# Patient Record
Sex: MALE | Race: WHITE | NOT HISPANIC OR LATINO | Employment: FULL TIME | URBAN - METROPOLITAN AREA
[De-identification: names, ages, dates, MRNs, and addresses within clinical notes are randomized per-mention and may not be internally consistent; named-entity substitution may affect disease eponyms.]

---

## 2023-03-04 LAB
ALBUMIN (G/DL) IN SER/PLAS: 4.1 G/DL (ref 3.4–5)
ALBUMIN (MG/L) IN URINE: 29 MG/L
ALBUMIN/CREATININE (UG/MG) IN URINE: 58.9 UG/MG CRT (ref 0–30)
ANION GAP IN SER/PLAS: 14 MMOL/L (ref 10–20)
CALCIDIOL (25 OH VITAMIN D3) (NG/ML) IN SER/PLAS: 37 NG/ML
CALCIUM (MG/DL) IN SER/PLAS: 9.9 MG/DL (ref 8.6–10.6)
CARBON DIOXIDE, TOTAL (MMOL/L) IN SER/PLAS: 22 MMOL/L (ref 21–32)
CHLORIDE (MMOL/L) IN SER/PLAS: 105 MMOL/L (ref 98–107)
CREATININE (MG/DL) IN SER/PLAS: 1.07 MG/DL (ref 0.5–1.3)
CREATININE (MG/DL) IN URINE: 49.2 MG/DL (ref 20–370)
ERYTHROCYTE DISTRIBUTION WIDTH (RATIO) BY AUTOMATED COUNT: 12.2 % (ref 11.5–14.5)
ERYTHROCYTE MEAN CORPUSCULAR HEMOGLOBIN CONCENTRATION (G/DL) BY AUTOMATED: 32.2 G/DL (ref 32–36)
ERYTHROCYTE MEAN CORPUSCULAR VOLUME (FL) BY AUTOMATED COUNT: 86 FL (ref 80–100)
ERYTHROCYTES (10*6/UL) IN BLOOD BY AUTOMATED COUNT: 5.22 X10E12/L (ref 4.5–5.9)
ESTIMATED AVERAGE GLUCOSE FOR HBA1C: 143 MG/DL
GFR MALE: 77 ML/MIN/1.73M2
GLUCOSE (MG/DL) IN SER/PLAS: 137 MG/DL (ref 74–99)
HEMATOCRIT (%) IN BLOOD BY AUTOMATED COUNT: 45 % (ref 41–52)
HEMOGLOBIN (G/DL) IN BLOOD: 14.5 G/DL (ref 13.5–17.5)
HEMOGLOBIN A1C/HEMOGLOBIN TOTAL IN BLOOD: 6.6 %
LEUKOCYTES (10*3/UL) IN BLOOD BY AUTOMATED COUNT: 6.5 X10E9/L (ref 4.4–11.3)
NRBC (PER 100 WBCS) BY AUTOMATED COUNT: 0 /100 WBC (ref 0–0)
PHOSPHATE (MG/DL) IN SER/PLAS: 2.9 MG/DL (ref 2.5–4.9)
PLATELETS (10*3/UL) IN BLOOD AUTOMATED COUNT: 213 X10E9/L (ref 150–450)
POTASSIUM (MMOL/L) IN SER/PLAS: 4.3 MMOL/L (ref 3.5–5.3)
SODIUM (MMOL/L) IN SER/PLAS: 137 MMOL/L (ref 136–145)
TACROLIMUS (NG/ML) IN BLOOD: 10.7 NG/ML (ref 2–15)
UREA NITROGEN (MG/DL) IN SER/PLAS: 29 MG/DL (ref 6–23)

## 2023-07-28 ENCOUNTER — APPOINTMENT (OUTPATIENT)
Dept: LAB | Facility: LAB | Age: 63
End: 2023-07-28
Payer: MEDICARE

## 2023-07-28 LAB
ALBUMIN (G/DL) IN SER/PLAS: 5.1 G/DL (ref 3.4–5)
ANION GAP IN SER/PLAS: 14 MMOL/L (ref 10–20)
CALCIUM (MG/DL) IN SER/PLAS: 11.5 MG/DL (ref 8.6–10.6)
CARBON DIOXIDE, TOTAL (MMOL/L) IN SER/PLAS: 25 MMOL/L (ref 21–32)
CHLORIDE (MMOL/L) IN SER/PLAS: 105 MMOL/L (ref 98–107)
CHOLESTEROL (MG/DL) IN SER/PLAS: 145 MG/DL (ref 0–199)
CHOLESTEROL IN HDL (MG/DL) IN SER/PLAS: 59.3 MG/DL
CHOLESTEROL/HDL RATIO: 2.4
CREATININE (MG/DL) IN SER/PLAS: 1.11 MG/DL (ref 0.5–1.3)
ERYTHROCYTE DISTRIBUTION WIDTH (RATIO) BY AUTOMATED COUNT: 12.7 % (ref 11.5–14.5)
ERYTHROCYTE MEAN CORPUSCULAR HEMOGLOBIN CONCENTRATION (G/DL) BY AUTOMATED: 31.5 G/DL (ref 32–36)
ERYTHROCYTE MEAN CORPUSCULAR VOLUME (FL) BY AUTOMATED COUNT: 88 FL (ref 80–100)
ERYTHROCYTES (10*6/UL) IN BLOOD BY AUTOMATED COUNT: 5.45 X10E12/L (ref 4.5–5.9)
GFR MALE: 74 ML/MIN/1.73M2
GLUCOSE (MG/DL) IN SER/PLAS: 125 MG/DL (ref 74–99)
HEMATOCRIT (%) IN BLOOD BY AUTOMATED COUNT: 48 % (ref 41–52)
HEMOGLOBIN (G/DL) IN BLOOD: 15.1 G/DL (ref 13.5–17.5)
LDL: 66 MG/DL (ref 0–99)
LEUKOCYTES (10*3/UL) IN BLOOD BY AUTOMATED COUNT: 5.3 X10E9/L (ref 4.4–11.3)
NRBC (PER 100 WBCS) BY AUTOMATED COUNT: 0 /100 WBC (ref 0–0)
PHOSPHATE (MG/DL) IN SER/PLAS: 2.4 MG/DL (ref 2.5–4.9)
PLATELETS (10*3/UL) IN BLOOD AUTOMATED COUNT: 203 X10E9/L (ref 150–450)
POTASSIUM (MMOL/L) IN SER/PLAS: 4.7 MMOL/L (ref 3.5–5.3)
SODIUM (MMOL/L) IN SER/PLAS: 139 MMOL/L (ref 136–145)
TACROLIMUS (NG/ML) IN BLOOD: 6.2 NG/ML (ref 2–15)
TRIGLYCERIDE (MG/DL) IN SER/PLAS: 99 MG/DL (ref 0–149)
UREA NITROGEN (MG/DL) IN SER/PLAS: 24 MG/DL (ref 6–23)
VLDL: 20 MG/DL (ref 0–40)

## 2023-07-31 LAB — FRUCTOSAMINE SER/PLAS: 269 UMOL/L (ref 205–285)

## 2023-08-18 RX ORDER — MYCOPHENOLATE MOFETIL 250 MG/1
750 CAPSULE ORAL 2 TIMES DAILY
COMMUNITY
Start: 2017-08-04

## 2023-08-18 RX ORDER — SIMVASTATIN 20 MG/1
20 TABLET, FILM COATED ORAL DAILY
COMMUNITY
Start: 2016-11-12

## 2023-08-18 RX ORDER — TACROLIMUS 1 MG/1
2 CAPSULE ORAL 2 TIMES DAILY
COMMUNITY
Start: 2017-08-04 | End: 2024-03-04 | Stop reason: SDUPTHER

## 2023-08-18 RX ORDER — LISINOPRIL 10 MG/1
10 TABLET ORAL DAILY
COMMUNITY
Start: 2019-01-25

## 2023-08-18 RX ORDER — INSULIN DEGLUDEC 100 U/ML
8 INJECTION, SOLUTION SUBCUTANEOUS DAILY
COMMUNITY

## 2023-08-18 RX ORDER — FINASTERIDE 5 MG/1
1 TABLET, FILM COATED ORAL EVERY MORNING
COMMUNITY
Start: 2019-07-09

## 2023-08-18 RX ORDER — SULFAMETHOXAZOLE AND TRIMETHOPRIM 400; 80 MG/1; MG/1
1 TABLET ORAL DAILY
COMMUNITY
Start: 2022-01-03

## 2023-08-18 RX ORDER — CLOTRIMAZOLE AND BETAMETHASONE DIPROPIONATE 10; .64 MG/G; MG/G
CREAM TOPICAL
COMMUNITY
Start: 2019-10-31

## 2023-08-18 RX ORDER — TEMAZEPAM 30 MG/1
30 CAPSULE ORAL NIGHTLY PRN
COMMUNITY

## 2023-08-18 RX ORDER — METOPROLOL TARTRATE 25 MG/1
25 TABLET, FILM COATED ORAL 2 TIMES DAILY
COMMUNITY
Start: 2023-06-19

## 2023-08-18 RX ORDER — TAMSULOSIN HYDROCHLORIDE 0.4 MG/1
0.4 CAPSULE ORAL EVERY MORNING
COMMUNITY

## 2023-08-18 RX ORDER — AMLODIPINE BESYLATE 10 MG/1
10 TABLET ORAL EVERY MORNING
COMMUNITY
End: 2024-06-04 | Stop reason: SDUPTHER

## 2023-08-18 RX ORDER — INSULIN ASPART 100 [IU]/ML
INJECTION, SOLUTION INTRAVENOUS; SUBCUTANEOUS
COMMUNITY
Start: 2021-02-22

## 2023-08-18 RX ORDER — INSULIN DEGLUDEC 100 U/ML
10 INJECTION, SOLUTION SUBCUTANEOUS DAILY
COMMUNITY

## 2023-08-18 RX ORDER — INSULIN GLARGINE 100 [IU]/ML
INJECTION, SOLUTION SUBCUTANEOUS
COMMUNITY
Start: 2021-03-22

## 2023-08-18 RX ORDER — INSULIN LISPRO 100 [IU]/ML
INJECTION, SOLUTION INTRAVENOUS; SUBCUTANEOUS
COMMUNITY
Start: 2023-04-27

## 2023-08-18 RX ORDER — PEN NEEDLE, DIABETIC 31 GX5/16"
NEEDLE, DISPOSABLE MISCELLANEOUS
COMMUNITY
Start: 2023-05-31

## 2023-08-18 RX ORDER — METOPROLOL SUCCINATE 25 MG/1
1 TABLET, EXTENDED RELEASE ORAL NIGHTLY
COMMUNITY
Start: 2022-08-17

## 2023-08-18 RX ORDER — NAPROXEN SODIUM 220 MG/1
81 TABLET, FILM COATED ORAL DAILY
COMMUNITY

## 2023-08-18 RX ORDER — CINACALCET 30 MG/1
30 TABLET, FILM COATED ORAL EVERY MORNING
COMMUNITY
End: 2024-06-04 | Stop reason: SDUPTHER

## 2023-08-18 RX ORDER — PANTOPRAZOLE SODIUM 40 MG/1
40 TABLET, DELAYED RELEASE ORAL EVERY MORNING
COMMUNITY

## 2023-08-18 RX ORDER — DULAGLUTIDE 0.75 MG/.5ML
0.75 INJECTION, SOLUTION SUBCUTANEOUS
COMMUNITY

## 2023-08-18 RX ORDER — METHYLPREDNISOLONE 4 MG/1
TABLET ORAL
COMMUNITY
Start: 2022-10-28

## 2023-08-18 RX ORDER — ALBUTEROL SULFATE 90 UG/1
2 AEROSOL, METERED RESPIRATORY (INHALATION) EVERY 4 HOURS PRN
COMMUNITY
Start: 2016-12-27

## 2023-08-18 RX ORDER — AMLODIPINE BESYLATE 5 MG/1
1 TABLET ORAL DAILY
COMMUNITY
Start: 2022-08-17

## 2023-08-18 RX ORDER — RAMELTEON 8 MG/1
8 TABLET ORAL NIGHTLY
COMMUNITY
Start: 2021-05-19

## 2023-08-18 RX ORDER — INSULIN LISPRO 100 [IU]/ML
INJECTION, SOLUTION INTRAVENOUS; SUBCUTANEOUS
COMMUNITY

## 2023-09-18 LAB
ALBUMIN (G/DL) IN SER/PLAS: 4.5 G/DL (ref 3.4–5)
ALBUMIN (MG/L) IN URINE: 37.7 MG/L
ALBUMIN/CREATININE (UG/MG) IN URINE: 83.2 UG/MG CRT (ref 0–30)
ANION GAP IN SER/PLAS: 13 MMOL/L (ref 10–20)
CALCIDIOL (25 OH VITAMIN D3) (NG/ML) IN SER/PLAS: 48 NG/ML
CALCIUM (MG/DL) IN SER/PLAS: 9.5 MG/DL (ref 8.6–10.6)
CARBON DIOXIDE, TOTAL (MMOL/L) IN SER/PLAS: 22 MMOL/L (ref 21–32)
CHLORIDE (MMOL/L) IN SER/PLAS: 107 MMOL/L (ref 98–107)
CREATININE (MG/DL) IN SER/PLAS: 0.97 MG/DL (ref 0.5–1.3)
CREATININE (MG/DL) IN URINE: 45.3 MG/DL (ref 20–370)
CREATININE (MG/DL) IN URINE: 45.3 MG/DL (ref 20–370)
ERYTHROCYTE DISTRIBUTION WIDTH (RATIO) BY AUTOMATED COUNT: 12.5 % (ref 11.5–14.5)
ERYTHROCYTE MEAN CORPUSCULAR HEMOGLOBIN CONCENTRATION (G/DL) BY AUTOMATED: 33.3 G/DL (ref 32–36)
ERYTHROCYTE MEAN CORPUSCULAR VOLUME (FL) BY AUTOMATED COUNT: 87 FL (ref 80–100)
ERYTHROCYTES (10*6/UL) IN BLOOD BY AUTOMATED COUNT: 5 X10E12/L (ref 4.5–5.9)
GFR MALE: 87 ML/MIN/1.73M2
GLUCOSE (MG/DL) IN SER/PLAS: 95 MG/DL (ref 74–99)
HEMATOCRIT (%) IN BLOOD BY AUTOMATED COUNT: 43.3 % (ref 41–52)
HEMOGLOBIN (G/DL) IN BLOOD: 14.4 G/DL (ref 13.5–17.5)
LEUKOCYTES (10*3/UL) IN BLOOD BY AUTOMATED COUNT: 6 X10E9/L (ref 4.4–11.3)
NRBC (PER 100 WBCS) BY AUTOMATED COUNT: 0 /100 WBC (ref 0–0)
PARATHYRIN INTACT (PG/ML) IN SER/PLAS: 75.3 PG/ML (ref 18.5–88)
PHOSPHATE (MG/DL) IN SER/PLAS: 2.4 MG/DL (ref 2.5–4.9)
PLATELETS (10*3/UL) IN BLOOD AUTOMATED COUNT: 221 X10E9/L (ref 150–450)
POTASSIUM (MMOL/L) IN SER/PLAS: 4.3 MMOL/L (ref 3.5–5.3)
PROTEIN (MG/DL) IN URINE: 14 MG/DL (ref 5–25)
PROTEIN/CREATININE (MG/MG) IN URINE: 0.31 MG/MG CREAT (ref 0–0.17)
SODIUM (MMOL/L) IN SER/PLAS: 138 MMOL/L (ref 136–145)
UREA NITROGEN (MG/DL) IN SER/PLAS: 25 MG/DL (ref 6–23)

## 2023-10-27 ENCOUNTER — APPOINTMENT (OUTPATIENT)
Dept: ENDOCRINOLOGY | Facility: CLINIC | Age: 63
End: 2023-10-27
Payer: MEDICARE

## 2024-02-27 ENCOUNTER — TELEPHONE (OUTPATIENT)
Dept: TRANSPLANT | Facility: HOSPITAL | Age: 64
End: 2024-02-27
Payer: MEDICARE

## 2024-03-04 DIAGNOSIS — Z94.0 KIDNEY REPLACED BY TRANSPLANT (HHS-HCC): ICD-10-CM

## 2024-03-04 DIAGNOSIS — E55.9 VITAMIN D DEFICIENCY DISEASE: ICD-10-CM

## 2024-03-04 RX ORDER — TACROLIMUS 1 MG/1
2 CAPSULE ORAL 2 TIMES DAILY
Qty: 120 CAPSULE | Refills: 11 | Status: SHIPPED | OUTPATIENT
Start: 2024-03-04 | End: 2025-03-04

## 2024-03-04 NOTE — TELEPHONE ENCOUNTER
Phone call made to patient. [facility] refill sent, pending Dr. Luque signature. Patient states that he is legally blind and has labs are being drawn at [facility]. Lab order requisition printed and will be sent to the patient. Instructed him to bring copies of the lab orders to his next lab appointment. We will also get him scheduled for a follow-up appointment.

## 2024-06-04 DIAGNOSIS — I10 ESSENTIAL (PRIMARY) HYPERTENSION: ICD-10-CM

## 2024-06-04 DIAGNOSIS — E21.3 HYPERPARATHYROIDISM (MULTI): Primary | ICD-10-CM

## 2024-06-04 RX ORDER — AMLODIPINE BESYLATE 10 MG/1
10 TABLET ORAL NIGHTLY
Qty: 90 TABLET | Refills: 3 | Status: SHIPPED | OUTPATIENT
Start: 2024-06-04

## 2024-06-04 RX ORDER — CINACALCET 30 MG/1
30 TABLET, FILM COATED ORAL DAILY
Qty: 30 TABLET | Refills: 9 | Status: SHIPPED | OUTPATIENT
Start: 2024-06-04

## 2024-06-05 ENCOUNTER — TELEPHONE (OUTPATIENT)
Dept: TRANSPLANT | Facility: HOSPITAL | Age: 64
End: 2024-06-05
Payer: MEDICARE

## 2024-06-05 NOTE — TELEPHONE ENCOUNTER
Looking to speak with someone about what labs he needs to have completed before his upcoming neph appointment

## 2024-06-05 NOTE — TELEPHONE ENCOUNTER
Returned phone call to the patient. Labs orders are in for every 3 months. Instructed him to have labs done before his clinic appointment on 6/14.

## 2024-06-07 ENCOUNTER — LAB (OUTPATIENT)
Dept: LAB | Facility: LAB | Age: 64
End: 2024-06-07
Payer: MEDICARE

## 2024-06-07 DIAGNOSIS — Z94.0 KIDNEY REPLACED BY TRANSPLANT (HHS-HCC): ICD-10-CM

## 2024-06-07 DIAGNOSIS — E55.9 VITAMIN D DEFICIENCY DISEASE: ICD-10-CM

## 2024-06-07 LAB
25(OH)D3 SERPL-MCNC: 42 NG/ML (ref 30–100)
ALBUMIN SERPL BCP-MCNC: 4.5 G/DL (ref 3.4–5)
ANION GAP SERPL CALC-SCNC: 12 MMOL/L (ref 10–20)
BUN SERPL-MCNC: 30 MG/DL (ref 6–23)
CALCIUM SERPL-MCNC: 10.7 MG/DL (ref 8.6–10.6)
CHLORIDE SERPL-SCNC: 103 MMOL/L (ref 98–107)
CO2 SERPL-SCNC: 27 MMOL/L (ref 21–32)
CREAT SERPL-MCNC: 0.93 MG/DL (ref 0.5–1.3)
CREAT UR-MCNC: 41.1 MG/DL (ref 20–370)
EGFRCR SERPLBLD CKD-EPI 2021: >90 ML/MIN/1.73M*2
ERYTHROCYTE [DISTWIDTH] IN BLOOD BY AUTOMATED COUNT: 14.2 % (ref 11.5–14.5)
GLUCOSE SERPL-MCNC: 224 MG/DL (ref 74–99)
HCT VFR BLD AUTO: 43.8 % (ref 41–52)
HGB BLD-MCNC: 14.2 G/DL (ref 13.5–17.5)
MCH RBC QN AUTO: 28.7 PG (ref 26–34)
MCHC RBC AUTO-ENTMCNC: 32.4 G/DL (ref 32–36)
MCV RBC AUTO: 89 FL (ref 80–100)
NRBC BLD-RTO: 0 /100 WBCS (ref 0–0)
PHOSPHATE SERPL-MCNC: 2.8 MG/DL (ref 2.5–4.9)
PLATELET # BLD AUTO: 208 X10*3/UL (ref 150–450)
POTASSIUM SERPL-SCNC: 4.7 MMOL/L (ref 3.5–5.3)
PROT UR-ACNC: 25 MG/DL (ref 5–25)
PROT/CREAT UR: 0.61 MG/MG CREAT (ref 0–0.17)
PTH-INTACT SERPL-MCNC: 94 PG/ML (ref 18.5–88)
RBC # BLD AUTO: 4.95 X10*6/UL (ref 4.5–5.9)
SODIUM SERPL-SCNC: 137 MMOL/L (ref 136–145)
TACROLIMUS BLD-MCNC: 7.7 NG/ML
WBC # BLD AUTO: 5.8 X10*3/UL (ref 4.4–11.3)

## 2024-06-07 PROCEDURE — 82306 VITAMIN D 25 HYDROXY: CPT

## 2024-06-07 PROCEDURE — 83970 ASSAY OF PARATHORMONE: CPT

## 2024-06-07 PROCEDURE — 84156 ASSAY OF PROTEIN URINE: CPT

## 2024-06-07 PROCEDURE — 82570 ASSAY OF URINE CREATININE: CPT

## 2024-06-07 PROCEDURE — 80197 ASSAY OF TACROLIMUS: CPT

## 2024-06-07 PROCEDURE — 80069 RENAL FUNCTION PANEL: CPT

## 2024-06-07 PROCEDURE — 85027 COMPLETE CBC AUTOMATED: CPT

## 2024-06-14 ENCOUNTER — OFFICE VISIT (OUTPATIENT)
Dept: TRANSPLANT | Facility: HOSPITAL | Age: 64
End: 2024-06-14

## 2024-06-14 VITALS
SYSTOLIC BLOOD PRESSURE: 160 MMHG | DIASTOLIC BLOOD PRESSURE: 90 MMHG | TEMPERATURE: 97.9 F | BODY MASS INDEX: 24 KG/M2 | OXYGEN SATURATION: 100 % | WEIGHT: 142 LBS | HEART RATE: 100 BPM

## 2024-06-14 DIAGNOSIS — E55.9 VITAMIN D DEFICIENCY: ICD-10-CM

## 2024-06-14 DIAGNOSIS — Z94.0 KIDNEY REPLACED BY TRANSPLANT (HHS-HCC): Primary | ICD-10-CM

## 2024-06-14 DIAGNOSIS — I15.1 HYPERTENSION SECONDARY TO OTHER RENAL DISORDERS: ICD-10-CM

## 2024-06-14 DIAGNOSIS — Z48.298 AFTERCARE FOLLOWING ORGAN TRANSPLANT: ICD-10-CM

## 2024-06-14 PROCEDURE — 3077F SYST BP >= 140 MM HG: CPT | Performed by: INTERNAL MEDICINE

## 2024-06-14 PROCEDURE — 1159F MED LIST DOCD IN RCRD: CPT | Performed by: INTERNAL MEDICINE

## 2024-06-14 PROCEDURE — 99215 OFFICE O/P EST HI 40 MIN: CPT | Performed by: INTERNAL MEDICINE

## 2024-06-14 PROCEDURE — 3080F DIAST BP >= 90 MM HG: CPT | Performed by: INTERNAL MEDICINE

## 2024-06-14 PROCEDURE — 1126F AMNT PAIN NOTED NONE PRSNT: CPT | Performed by: INTERNAL MEDICINE

## 2024-06-14 RX ORDER — ACETAMINOPHEN 500 MG
1000 TABLET ORAL EVERY 8 HOURS PRN
COMMUNITY
Start: 2024-03-15

## 2024-06-14 RX ORDER — DULAGLUTIDE 1.5 MG/.5ML
1.5 INJECTION, SOLUTION SUBCUTANEOUS
COMMUNITY
Start: 2024-04-19

## 2024-06-14 RX ORDER — ATORVASTATIN CALCIUM 40 MG/1
40 TABLET, FILM COATED ORAL DAILY
COMMUNITY
Start: 2023-08-01 | End: 2024-06-14 | Stop reason: WASHOUT

## 2024-06-14 ASSESSMENT — PAIN SCALES - GENERAL: PAINLEVEL: 0-NO PAIN

## 2024-06-14 NOTE — PROGRESS NOTES
TRANSPLANT NEPHROLOGY :   OUTPATIENT CLINIC NOTE      SERVICE DATE : 2024     REASON FOR VISIT/CHIEF COMPLAINT:  S/P  TRANSPLANT SURGERY  IMMUNOSUPPRESSIVE MEDICATION MANAGEMENT  BLOOD PRESSURE MANAGEMENT    HPI:    Mr. Sukhwinder Negrete is a 63 year old male with a history significant for diabetes, hypertension, BPH and End Stage Renal Disease secondary to presumed diabetic nephropathy and hypertensive nephrosclerosis. Patient underwent a  donor renal transplant on 2017.     Primary Cause of Organ Disease: Diabetes~and~Hypertensive Nephrosclerosis.   Donor/Transplant Type: A  donor renal transplant on: 17.   Transplant Course: standard thymoglobulin induction,~No DGF,~No CMV mismatch,~No EBV mismatch,~KDPI: 37%~and~PRA: 27.   Rejections: No episodes of rejection.   Infection: No episodes of infection.   Malignancies After Transplant: No episodes of post transplant malignancy.    Recently with uncontrolled hyperglycemia. Last A1C 10.0 2024. Following closely with endocrine.    S/p lt hip replacement 3/2024. S/p rehab x3 weks. Doing better with ambulation now.     Lives alone. No help at home.     Recent labs with stbale Cr, mild hyperCa noted.     Denied chest pain, SOB, VALERIO, Palpitation. Normal urination and bowel movement. Normal gait and no weakness of arms/legs. No cough, runny nose, sore throat, cold symptoms, or rash. No hearing loss. Normal vision.No problems with his sleep, mood and function. No recent infection, hospitalization, surgery or ER visits.      ROS:  Review of  14 systems was performed system by system. See HPI. Otherwise, the symptoms were negative.    PAST MEDICAL HISTORY:  Past Medical History:   Diagnosis Date    [place] blindness, as defined in [place] 10/27/2015    Legally blind        PAST SURGICAL HISTORY:  Past Surgical History:   Procedure Laterality Date    COLONOSCOPY  10/27/2015    Colonoscopy (Fiberoptic)    MYRINGOTOMY W/ TUBES  10/27/2015    Myringotomy  - With Ventilating Tube Insertion    OTHER SURGICAL HISTORY  10/27/2015    Hemodialysis Access Type Arteriovenous Fistula Left Arm    TONSILLECTOMY  10/27/2015    Tonsillectomy With Adenoidectomy        SOCIAL HISTORY:  Social History     Socioeconomic History    Marital status:      Spouse name: Not on file    Number of children: Not on file    Years of education: Not on file    Highest education level: Not on file   Occupational History    Not on file   Tobacco Use    Smoking status: Not on file    Smokeless tobacco: Not on file   Substance and Sexual Activity    Alcohol use: Not on file    Drug use: Not on file    Sexual activity: Not on file   Other Topics Concern    Not on file   Social History Narrative    Not on file     Social Determinants of Health     Financial Resource Strain: Low Risk  (3/12/2024)    Received from [facility]    Overall Financial Resource Strain (CARDIA)     Difficulty of Paying Living Expenses: Not hard at all   Food Insecurity: No Food Insecurity (5/21/2024)    Received from [facility]    Hunger Vital Sign     Worried About Running Out of Food in the Last Year: Never true     Ran Out of Food in the Last Year: Never true   Transportation Needs: No Transportation Needs (5/21/2024)    Received from [facility]    PRAPARE - Transportation     Lack of Transportation (Medical): No     Lack of Transportation (Non-Medical): No   Physical Activity: Insufficiently Active (2/22/2024)    Received from [facility]    Exercise Vital Sign     Days of Exercise per Week: 5 days     Minutes of Exercise per Session: 10 min   Stress: No Stress Concern Present (2/22/2024)    Received from [facility]    [employer] - Occupational Stress Questionnaire     Feeling of Stress : Not at all   Social Connections: Moderately Integrated (2/22/2024)    Received from [facility]    Social Connection and Isolation Panel [NHANES]     Frequency of Communication  "with Friends and Family: Twice a week     Frequency of Social Gatherings with Friends and Family: Once a week     Attends Congregational Services: More than 4 times per year     Active Member of Clubs or Organizations: Yes     Attends Club or Organization Meetings: More than 4 times per year     Marital Status:    Intimate Partner Violence: Not on file   Housing Stability: Low Risk  (5/21/2024)    Received from [facility]    Housing Stability Vital Sign     Unable to Pay for Housing in the Last Year: No     Number of Places Lived in the Last Year: 1     Unstable Housing in the Last Year: No       FAMILY HISTORY:  Family History   Problem Relation Name Age of Onset    No Known Problems Mother         MEDICATION LIST:  Current Outpatient Medications   Medication Instructions    acetaminophen (TYLENOL) 1,000 mg, oral, Every 8 hours PRN    albuterol 90 mcg/actuation inhaler 2 puffs, inhalation, Every 4 hours PRN, Provide aerochamber or similar spacer device to the patient and teach them how to use it    amLODIPine (Norvasc) 5 mg tablet 1 tablet, oral, Daily    amLODIPine (NORVASC) 10 mg, oral, Nightly    aspirin 81 mg, oral, Daily    BD Ultra-Fine Short Pen Needle 31 gauge x 5/16\" needle USE 4 TIMES DAILY AS DIRECTED<BR>    blood sugar diagnostic strip use twice daily<BR>    cinacalcet (SENSIPAR) 30 mg, oral, Daily    clotrimazole-betamethasone (Lotrisone) cream Topical    finasteride (Proscar) 5 mg tablet 1 tablet, oral, Every morning    HumaLOG KwikPen Insulin 100 unit/mL injection use per sliding scale with meals max 27 units per day<BR>    insulin aspart (NovoLOG) 100 unit/mL (3 mL) pen subcutaneous    insulin degludec (TRESIBA FLEXTOUCH) 8 Units, subcutaneous, Daily, Take as directed per insulin instructions.    insulin glargine (Basaglar KwikPen U-100 Insulin) 100 unit/mL (3 mL) pen as directed Subcutaneous<BR>    insulin lispro (HumaLOG U-100 Insulin) 100 unit/mL injection as directed Subcutaneous<BR>    " "lisinopril 10 mg, oral, Daily    metoprolol tartrate (LOPRESSOR) 25 mg, oral, 2 times daily    mycophenolate (CELLCEPT) 750 mg, oral, 2 times daily    pantoprazole (PROTONIX) 40 mg, oral, Every morning    ramelteon (ROZEREM) 8 mg, oral, Nightly    simvastatin (ZOCOR) 20 mg, oral, Daily    tacrolimus (PROGRAF) 2 mg, oral, 2 times daily    tamsulosin (FLOMAX) 0.4 mg, oral, Every morning    Tresiba FlexTouch U-100 10 Units, subcutaneous, Daily    Trulicity 1.5 mg, subcutaneous, Once Weekly       ALLERGY  No Known Allergies    PHYSICAL EXAM:    Visit Vitals  /90   Pulse 100   Temp 36.6 °C (97.9 °F) (Temporal)   Wt 64.4 kg (142 lb)   SpO2 100%   BMI 24.00 kg/m²   BSA 1.71 m²          General Appearance - NAD, A&Ox3   HEENT - Supple. Not pale. No jaundice. No JVD or LAD  CVS - RRR. Normal S1/S2. No murmur, rub or gallop  Lungs- clear to auscultation bilaterally  Abdomen - soft , NT, ND, no guarding. No hepatosplenomegaly. No allograft tenderness  Musculoskeletal /Extremities - no edema. Full ROM. No joint tenderness.   Neuro/Psych - appropriate mood and affect. Motor power V/V all extremities. CN I -XII were grossly intact.  Skin - No visible rash      LABS:    Lab Results   Component Value Date    CREATININE 0.93 06/07/2024    BUN 30 (H) 06/07/2024     06/07/2024    K 4.7 06/07/2024     06/07/2024    CO2 27 06/07/2024     Lab Results   Component Value Date    PTH 94.0 (H) 06/07/2024    CALCIUM 10.7 (H) 06/07/2024    PHOS 2.8 06/07/2024     Lab Results   Component Value Date    WBC 5.8 06/07/2024    HGB 14.2 06/07/2024    HCT 43.8 06/07/2024    MCV 89 06/07/2024     06/07/2024     No results found for: \"IRON\", \"TIBC\", \"FERRITIN\"  Lab Results   Component Value Date    TACROLIMUS 7.7 06/07/2024    BKVIRPCRQN Not Detected 11/20/2019     No results found for: \"CMVDNAPCR\", \"BKVDNAPCR\", \"EBVDNAPCR\"      ASSESSMENT AND PLAN:    Mr. Negrete is a 64 y.o. male  who is here for follow up s/p kidney " transplant.    TRANSPLANT DATE: 8/2/2017 (Kidney)      1. ESRD S/P kidney transplant   - Creatinine last check was 0.93, stable allograft function  -Random urine protein/creatinine ratio is 600 mg/g, worsening in the setting of uncontrolled hyperglycemia. On lisinopril 10 mg/d, will titrate dose as indicated  -Ensure adequate hydration  - Avoid nephrotoxic medications, NSAIDs, and IV contrast.    2. Immunosuppression  -Tacrolimus level last check was 7.7, acceptable. Goal 5-8  -Continue  mg q12, not on Pred   - no recent virals checked    3. Electrolytes  - acceptable serum K, Co2. Mild hyperCa noted.     4. Hypertension  - elevated today but home Bps low per pt. Advised to call with BP log in 2-3 weeks for med adjustment  - Pt also follow with [facility] neph[facility]  - no hypervolemia on exam    5. Bone Mineral Disease/Osteoporosis  - mild hyperCa noted, phos acceptable. PTH 94. On cinacalcet 30 mg/d.   - rpt labs in a month to trend Ca, Cr, PTH. Titrate meds as indicted  - Consider DEXA every 2-3 years , defer to PCP    6.Anemia/Leukopenia:  - Hb ~14, acceptable  - WBC stable    7.Health maintenance and vaccination  - Flu shot during flu season annually  - Cancer screening is up to date per the patient    Lab : Routine transplant lab ( CBC, RFP, and anti-rejection trough level ) every 3 months  Additional labs:  VIT D, PTH Q3 months  Viral screening PCR, Allosure and UPC per protocol.    Additional Plan :  - rpt labs in a month. Further plan based on rpt labs.   - follow with endo cor glycemic control. Goal A1C ~7.    RTC 9-12 month(s)

## 2024-06-20 ENCOUNTER — TELEPHONE (OUTPATIENT)
Dept: TRANSPLANT | Facility: HOSPITAL | Age: 64
End: 2024-06-20

## 2024-06-20 DIAGNOSIS — Z94.0 KIDNEY REPLACED BY TRANSPLANT (HHS-HCC): ICD-10-CM

## 2024-06-21 RX ORDER — PANTOPRAZOLE SODIUM 40 MG/1
40 TABLET, DELAYED RELEASE ORAL EVERY MORNING
Qty: 30 TABLET | Refills: 11 | Status: SHIPPED | OUTPATIENT
Start: 2024-06-21 | End: 2025-06-21

## 2024-07-08 ENCOUNTER — TELEPHONE (OUTPATIENT)
Dept: TRANSPLANT | Facility: HOSPITAL | Age: 64
End: 2024-07-08
Payer: MEDICARE

## 2024-07-08 DIAGNOSIS — Z94.0 KIDNEY REPLACED BY TRANSPLANT (HHS-HCC): ICD-10-CM

## 2024-07-10 RX ORDER — LISINOPRIL 10 MG/1
10 TABLET ORAL DAILY
Qty: 30 TABLET | Refills: 11 | Status: SHIPPED | OUTPATIENT
Start: 2024-07-10 | End: 2025-07-10

## 2024-07-10 RX ORDER — TAMSULOSIN HYDROCHLORIDE 0.4 MG/1
0.4 CAPSULE ORAL EVERY MORNING
Qty: 30 CAPSULE | Refills: 11 | Status: SHIPPED | OUTPATIENT
Start: 2024-07-10 | End: 2025-07-10

## 2024-07-10 RX ORDER — PANTOPRAZOLE SODIUM 40 MG/1
40 TABLET, DELAYED RELEASE ORAL EVERY MORNING
Qty: 30 TABLET | Refills: 11 | Status: SHIPPED | OUTPATIENT
Start: 2024-07-10 | End: 2025-07-10

## 2024-07-10 RX ORDER — MYCOPHENOLATE MOFETIL 250 MG/1
750 CAPSULE ORAL 2 TIMES DAILY
Qty: 180 CAPSULE | Refills: 11 | Status: SHIPPED | OUTPATIENT
Start: 2024-07-10 | End: 2025-07-10

## 2024-07-10 NOTE — TELEPHONE ENCOUNTER
[facility], looking to neville dueñas someone about miss prescriptions for patients Flomax 0.4mg one cap daily, Cellept 3 caps twice daily, Lisinopril, and Protonix.

## 2024-07-10 NOTE — TELEPHONE ENCOUNTER
Returned phone call to [facility] at [phone].  Spoke with Virignie. Requesting refill for Flomax 0.4 mg Q 24 hrs; lisinopril 10 mg once daily; mycophenolate 750 mg BID; Pantoprazole 40 mg once daily. Scripts pending Dr. Owens signature.

## 2024-07-11 ENCOUNTER — TELEPHONE (OUTPATIENT)
Dept: TRANSPLANT | Facility: HOSPITAL | Age: 64
End: 2024-07-11

## 2024-07-19 NOTE — TELEPHONE ENCOUNTER
Patient called requesting a c/b he  had paid a bill here at a visit and is requesting a refund? I have added Stephania knowles to the email

## 2024-08-12 ENCOUNTER — DOCUMENTATION (OUTPATIENT)
Dept: TRANSPLANT | Facility: HOSPITAL | Age: 64
End: 2024-08-12
Payer: MEDICARE

## 2024-08-12 NOTE — PROGRESS NOTES
Spoke to patient & he stated he has a Supp w/[employer] and he will email or call me with the information.

## 2024-08-22 ENCOUNTER — DOCUMENTATION (OUTPATIENT)
Dept: TRANSPLANT | Facility: HOSPITAL | Age: 64
End: 2024-08-22
Payer: MEDICARE

## 2024-08-22 NOTE — PROGRESS NOTES
Patient called with his Medicare Supp information with [employer] .  Called & verifoied policy [id number] Supp Plan G active 03/01/24.

## 2025-03-11 ENCOUNTER — TELEPHONE (OUTPATIENT)
Facility: HOSPITAL | Age: 65
End: 2025-03-11
Payer: MEDICARE

## 2025-03-11 DIAGNOSIS — Z94.0 KIDNEY REPLACED BY TRANSPLANT (HHS-HCC): ICD-10-CM

## 2025-03-11 DIAGNOSIS — E21.3 HYPERPARATHYROIDISM (MULTI): ICD-10-CM

## 2025-03-11 DIAGNOSIS — E55.9 VITAMIN D DEFICIENCY: ICD-10-CM

## 2025-03-11 RX ORDER — TACROLIMUS 1 MG/1
2 CAPSULE ORAL 2 TIMES DAILY
Qty: 120 CAPSULE | Refills: 11 | Status: SHIPPED | OUTPATIENT
Start: 2025-03-11 | End: 2026-03-11

## 2025-03-11 NOTE — TELEPHONE ENCOUNTER
[place], [place] - Count includes the [facility]  [address]  [facility] [address]  Phone: [phone] Fax: [phone]         tacrolimus (Prograf) 1 mg capsule Take 2 capsules (2 mg) by mouth 2 times a day

## 2025-03-21 ENCOUNTER — TELEPHONE (OUTPATIENT)
Facility: HOSPITAL | Age: 65
End: 2025-03-21
Payer: MEDICARE

## 2025-03-21 NOTE — TELEPHONE ENCOUNTER
Patient paged on call phone stating he got a notification that someone sent him a message on [employer] and he is visually impaired so he asked for assistance with what the message says. Advised the only recent encounter I see is from 3/11 which was for a tac refill. No further questions at this time

## 2025-04-08 DIAGNOSIS — E21.3 HYPERPARATHYROIDISM (MULTI): ICD-10-CM

## 2025-04-08 DIAGNOSIS — I10 ESSENTIAL (PRIMARY) HYPERTENSION: ICD-10-CM

## 2025-04-09 RX ORDER — CINACALCET 30 MG/1
30 TABLET, FILM COATED ORAL DAILY
Qty: 31 TABLET | Refills: 9 | Status: SHIPPED | OUTPATIENT
Start: 2025-04-09

## 2025-05-06 DIAGNOSIS — I10 ESSENTIAL (PRIMARY) HYPERTENSION: ICD-10-CM

## 2025-05-06 RX ORDER — AMLODIPINE BESYLATE 10 MG/1
10 TABLET ORAL NIGHTLY
Qty: 90 TABLET | Refills: 3 | Status: SHIPPED | OUTPATIENT
Start: 2025-05-06

## 2025-05-30 DIAGNOSIS — Z94.0 KIDNEY REPLACED BY TRANSPLANT (HHS-HCC): ICD-10-CM

## 2025-05-30 DIAGNOSIS — E21.3 HYPERPARATHYROIDISM (MULTI): ICD-10-CM

## 2025-06-13 ENCOUNTER — APPOINTMENT (OUTPATIENT)
Facility: HOSPITAL | Age: 65
End: 2025-06-13
Payer: MEDICARE

## 2025-06-13 ENCOUNTER — OFFICE VISIT (OUTPATIENT)
Facility: HOSPITAL | Age: 65
End: 2025-06-13
Payer: MEDICARE

## 2025-06-13 VITALS
BODY MASS INDEX: 22.05 KG/M2 | TEMPERATURE: 97 F | WEIGHT: 130.5 LBS | HEART RATE: 101 BPM | SYSTOLIC BLOOD PRESSURE: 138 MMHG | OXYGEN SATURATION: 98 % | DIASTOLIC BLOOD PRESSURE: 71 MMHG

## 2025-06-13 DIAGNOSIS — E21.3 HYPERPARATHYROIDISM (MULTI): ICD-10-CM

## 2025-06-13 DIAGNOSIS — Z48.298 AFTERCARE FOLLOWING ORGAN TRANSPLANT: ICD-10-CM

## 2025-06-13 DIAGNOSIS — Z94.0 KIDNEY REPLACED BY TRANSPLANT (HHS-HCC): Primary | ICD-10-CM

## 2025-06-13 DIAGNOSIS — I15.1 HYPERTENSION SECONDARY TO OTHER RENAL DISORDERS: ICD-10-CM

## 2025-06-13 DIAGNOSIS — Z92.25 PERSONAL HISTORY OF IMMUNOSUPRESSION THERAPY: ICD-10-CM

## 2025-06-13 LAB
25(OH)D3 SERPL-MCNC: 57 NG/ML (ref 30–100)
ALBUMIN SERPL BCP-MCNC: 4.5 G/DL (ref 3.4–5)
ANION GAP SERPL CALC-SCNC: 13 MMOL/L (ref 10–20)
BUN SERPL-MCNC: 23 MG/DL (ref 6–23)
CALCIUM SERPL-MCNC: 11.3 MG/DL (ref 8.6–10.6)
CHLORIDE SERPL-SCNC: 106 MMOL/L (ref 98–107)
CO2 SERPL-SCNC: 25 MMOL/L (ref 21–32)
CREAT SERPL-MCNC: 0.98 MG/DL (ref 0.5–1.3)
CREAT UR-MCNC: 74.7 MG/DL (ref 20–370)
EGFRCR SERPLBLD CKD-EPI 2021: 85 ML/MIN/1.73M*2
ERYTHROCYTE [DISTWIDTH] IN BLOOD BY AUTOMATED COUNT: 12.7 % (ref 11.5–14.5)
GLUCOSE SERPL-MCNC: 120 MG/DL (ref 74–99)
HCT VFR BLD AUTO: 42.9 % (ref 41–52)
HGB BLD-MCNC: 14 G/DL (ref 13.5–17.5)
MCH RBC QN AUTO: 28.4 PG (ref 26–34)
MCHC RBC AUTO-ENTMCNC: 32.6 G/DL (ref 32–36)
MCV RBC AUTO: 87 FL (ref 80–100)
NRBC BLD-RTO: 0 /100 WBCS (ref 0–0)
PHOSPHATE SERPL-MCNC: 2.7 MG/DL (ref 2.5–4.9)
PLATELET # BLD AUTO: 219 X10*3/UL (ref 150–450)
POTASSIUM SERPL-SCNC: 4.8 MMOL/L (ref 3.5–5.3)
PROT UR-ACNC: 25 MG/DL (ref 5–25)
PROT/CREAT UR: 0.33 MG/MG CREAT (ref 0–0.17)
PTH-INTACT SERPL-MCNC: 66.1 PG/ML (ref 18.5–88)
RBC # BLD AUTO: 4.93 X10*6/UL (ref 4.5–5.9)
SODIUM SERPL-SCNC: 139 MMOL/L (ref 136–145)
TACROLIMUS BLD-MCNC: 5.4 NG/ML
WBC # BLD AUTO: 8 X10*3/UL (ref 4.4–11.3)

## 2025-06-13 PROCEDURE — 82306 VITAMIN D 25 HYDROXY: CPT | Performed by: INTERNAL MEDICINE

## 2025-06-13 PROCEDURE — 80197 ASSAY OF TACROLIMUS: CPT | Performed by: INTERNAL MEDICINE

## 2025-06-13 PROCEDURE — 99215 OFFICE O/P EST HI 40 MIN: CPT

## 2025-06-13 PROCEDURE — 82570 ASSAY OF URINE CREATININE: CPT | Performed by: INTERNAL MEDICINE

## 2025-06-13 PROCEDURE — 80069 RENAL FUNCTION PANEL: CPT | Performed by: INTERNAL MEDICINE

## 2025-06-13 PROCEDURE — 83970 ASSAY OF PARATHORMONE: CPT | Performed by: INTERNAL MEDICINE

## 2025-06-13 PROCEDURE — 85027 COMPLETE CBC AUTOMATED: CPT | Performed by: INTERNAL MEDICINE

## 2025-06-13 ASSESSMENT — PAIN SCALES - GENERAL: PAINLEVEL_OUTOF10: 0-NO PAIN

## 2025-06-13 NOTE — PATIENT INSTRUCTIONS
PLAN  Return to clinic 1 year  Labs yearly for TI  See local nephrologist and labs with them every 3 months.

## 2025-06-13 NOTE — PROGRESS NOTES
TRANSPLANT NEPHROLOGY :   OUTPATIENT CLINIC NOTE      SERVICE DATE : 2025    REASON FOR VISIT/CHIEF COMPLAINT:  S/P  TRANSPLANT SURGERY  IMMUNOSUPPRESSIVE MEDICATION MANAGEMENT  BLOOD PRESSURE MANAGEMENT    HPI:    Mr. Negrete is a 65 y.o. male with past medical history significant for ESKD attributed to diabetes and hypertension s/p DDKT 2017    Primary Cause of Organ Disease: Diabetes~and~Hypertensive Nephrosclerosis.   Donor/Transplant Type: A  donor renal transplant on: 17.   Transplant Course: standard thymoglobulin induction,~No DGF,~No CMV mismatch,~No EBV mismatch,~KDPI: 37%~and~PRA: 27.   Rejections: No episodes of rejection.   Infection: No episodes of infection.   Malignancies After Transplant: No episodes of post transplant malignancy.    S/p left hip replacement 3/2024. S/p rehab x3 weeks    7 years 10 months out  Lab pending  Started on Ozempic and has lost 12 lbs in the past year.  Home BP has been well controlled.      Patient is doing well overall. No new complaints. Denied chest pain, SOB, VALERIO, Palpitation. Normal urination and bowel movement. Normal gait and no weakness of arms/legs. No cough, runny nose, sore throat, cold symptoms, or rash. No hearing loss. Normal vision.No problems with his sleep, mood and function. No recent infection, hospitalization, surgery or ER visits.      ROS:  Review of  14 systems was performed system by system. See HPI. Otherwise, the symptoms were negative.    PAST MEDICAL HISTORY:  Medical History[1]     PAST SURGICAL HISTORY:  Surgical History[2]     SOCIAL HISTORY:  Social History     Socioeconomic History    Marital status:      Spouse name: Not on file    Number of children: Not on file    Years of education: Not on file    Highest education level: Not on file   Occupational History    Not on file   Tobacco Use    Smoking status: Not on file    Smokeless tobacco: Not on file   Substance and Sexual Activity    Alcohol use: Not  on file    Drug use: Not on file    Sexual activity: Not on file   Other Topics Concern    Not on file   Social History Narrative    Not on file     Social Drivers of Health     Financial Resource Strain: Low Risk  (2/25/2025)    Received from [facility]    Overall Financial Resource Strain (CARDIA)     Difficulty of Paying Living Expenses: Not hard at all   Food Insecurity: No Food Insecurity (2/25/2025)    Received from [facility]    Hunger Vital Sign     Worried About Running Out of Food in the Last Year: Never true     Ran Out of Food in the Last Year: Never true   Transportation Needs: No Transportation Needs (2/25/2025)    Received from [facility]    PRAPARE - Transportation     Lack of Transportation (Medical): No     Lack of Transportation (Non-Medical): No   Physical Activity: Inactive (2/25/2025)    Received from [facility]    Exercise Vital Sign     Days of Exercise per Week: 0 days     Minutes of Exercise per Session: 0 min   Stress: No Stress Concern Present (2/25/2025)    Received from [facility]    [employer] - Occupational Stress Questionnaire     Feeling of Stress : Not at all   Social Connections: Moderately Isolated (2/25/2025)    Received from [facility]    Social Connection and Isolation Panel [NHANES]     Frequency of Communication with Friends and Family: Once a week     Frequency of Social Gatherings with Friends and Family: Once a week     Attends Mormon Services: More than 4 times per year     Active Member of Clubs or Organizations: Yes     Attends Club or Organization Meetings: More than 4 times per year     Marital Status:    Intimate Partner Violence: Not on file   Housing Stability: Low Risk  (2/25/2025)    Received from [facility]    Housing Stability Vital Sign     Unable to Pay for Housing in the Last Year: No     Number of Times Moved in the Last Year: 1     Homeless in the Last Year: No       FAMILY HISTORY:  Family  "History[3]    MEDICATION LIST:  Current Outpatient Medications   Medication Instructions    acetaminophen (TYLENOL) 1,000 mg, oral, Every 8 hours PRN    albuterol 90 mcg/actuation inhaler 2 puffs, inhalation, Every 4 hours PRN, Provide aerochamber or similar spacer device to the patient and teach them how to use it    amLODIPine (Norvasc) 5 mg tablet 1 tablet, oral, Daily    amLODIPine (NORVASC) 10 mg, oral, Nightly    aspirin 81 mg, oral, Daily    BD Ultra-Fine Short Pen Needle 31 gauge x 5/16\" needle USE 4 TIMES DAILY AS DIRECTED      blood sugar diagnostic strip use twice daily      cinacalcet (SENSIPAR) 30 mg, oral, Daily    clotrimazole-betamethasone (Lotrisone) cream Topical    finasteride (Proscar) 5 mg tablet 1 tablet, oral, Every morning    HumaLOG KwikPen Insulin 100 unit/mL injection use per sliding scale with meals max 27 units per day      insulin aspart (NovoLOG) 100 unit/mL (3 mL) pen subcutaneous    insulin degludec (TRESIBA FLEXTOUCH) 8 Units, subcutaneous, Daily, Take as directed per insulin instructions.    insulin glargine (Basaglar KwikPen U-100 Insulin) 100 unit/mL (3 mL) pen as directed Subcutaneous      insulin lispro (HumaLOG U-100 Insulin) 100 unit/mL injection as directed Subcutaneous      lisinopril 10 mg, oral, Daily    metoprolol tartrate (LOPRESSOR) 25 mg, oral, 2 times daily    mycophenolate (CELLCEPT) 750 mg, oral, 2 times daily    pantoprazole (PROTONIX) 40 mg, oral, Every morning    ramelteon (ROZEREM) 8 mg, oral, Nightly    simvastatin (ZOCOR) 20 mg, oral, Daily    tacrolimus (PROGRAF) 2 mg, oral, 2 times daily    tamsulosin (FLOMAX) 0.4 mg, oral, Every morning    Tresiba FlexTouch U-100 10 Units, subcutaneous, Daily    Trulicity 1.5 mg, subcutaneous, Once Weekly       ALLERGY  Allergies[4]    PHYSICAL EXAM:    Vitals:    06/13/25 0945   BP: 138/71   Pulse: 101   Temp: 36.1 °C (97 °F)   SpO2: 98%     Vital signs - reviewed. Acceptable BP at this office visit.   General Appearance " - NAD, Good speech, oriented and alert  HEENT - Supple. Not pale. No jaundice.  CVS - RRR. Normal S1/S2. No murmur, click , rub or gallop  Lungs- clear to auscultation bilaterally  Abdomen - soft , not tender, no guarding, no rigidity. S/P Kidney transplant .  Transplanted kidney is not tender.   Musculoskeletal /Extremities - no edema. Full ROM. No joint tenderness.   Neuro/Psych - appropriate mood and affect. Motor power V/V all extremities. CN I -XII were grossly intact.  Skin - No visible rash      LABS:    Lab Results   Component Value Date    WBC 5.8 06/07/2024    HGB 14.2 06/07/2024    HCT 43.8 06/07/2024     06/07/2024    CHOL 145 07/28/2023    TRIG 99 07/28/2023    HDL 59.3 07/28/2023    LDLDIRECT 65 02/08/2023    ALT 11 02/08/2023    AST 16 02/08/2023     06/07/2024    K 4.7 06/07/2024     06/07/2024    CREATININE 0.93 06/07/2024    BUN 30 (H) 06/07/2024    CO2 27 06/07/2024    TSH 1.21 02/08/2023    HGBA1C 6.5 (A) 04/16/2025       ASSESSMENT AND PLAN:    Mr. Negrete is a 65 y.o. male  who is here for follow up s/p kidney transplant.    TRANSPLANT DATE: 8/2/2017 (Kidney)      1. ESRD S/P kidney transplant   - Creatinine last check was :  Lab Results   Component Value Date    CREATININE 0.93 06/07/2024       - Renal allograft function is excellent.  Low-level proteinuria  -Ensure adequate hydration  - Avoid nephrotoxic medications, NSAIDs, and IV contrast.  LUTS - Flomax    2. Immunosuppression  -Tacrolimus level is due  -Continue current immunosuppression regimen.  TAC 2 mg BID   mg BID  Steroid-free    3. Electrolytes  Lab Results   Component Value Date    GLUCOSE 224 (H) 06/07/2024    CALCIUM 10.7 (H) 06/07/2024     06/07/2024    K 4.7 06/07/2024    CO2 27 06/07/2024     06/07/2024    BUN 30 (H) 06/07/2024    CREATININE 0.93 06/07/2024     -Acceptable from last lab drawn    4. Hypertension  -Home  BP had been acceptable  -Encourage to monitor home BP  -Continue current  anti hypertensive medication  Lisinopril 10 mg/day  Amlodipine 10/5    5. Bone Mineral Disease/Osteoporosis  Lab Results   Component Value Date    PTH 94.0 (H) 06/07/2024    CALCIUM 10.7 (H) 06/07/2024    PHOS 2.8 06/07/2024    VITD25 42 06/07/2024     Acceptable MBD parameters  Cinacalcet 30 mg/day    6.Anemia  Lab Results   Component Value Date    WBC 5.8 06/07/2024    HGB 14.2 06/07/2024    HCT 43.8 06/07/2024    MCV 89 06/07/2024     06/07/2024   At goal    7.Health maintenance and vaccination  - Flu shot during flu season annually  - Cancer screening is up to date per the patient    Lab : Routine transplant lab ( CBC, RFP, and anti-rejection trough level ) every 12 months  Additional labs:  VIT D, PTH with next lab  Viral screening PCR, Allosure and UPC per protocol.    Additional Plan :  Lab today is still pending - will follow    RTC 12 month(s)    Lorraine Krishnamurthy MD    Transplant Nephrology        [1]   Past Medical History:  Diagnosis Date    [place] blindness, as defined in [place] 10/27/2015    Legally blind   [2]   Past Surgical History:  Procedure Laterality Date    COLONOSCOPY  10/27/2015    Colonoscopy (Fiberoptic)    MYRINGOTOMY W/ TUBES  10/27/2015    Myringotomy - With Ventilating Tube Insertion    OTHER SURGICAL HISTORY  10/27/2015    Hemodialysis Access Type Arteriovenous Fistula Left Arm    TONSILLECTOMY  10/27/2015    Tonsillectomy With Adenoidectomy   [3]   Family History  Problem Relation Name Age of Onset    No Known Problems Mother     [4] No Known Allergies

## 2025-06-30 ENCOUNTER — TELEPHONE (OUTPATIENT)
Facility: HOSPITAL | Age: 65
End: 2025-06-30
Payer: MEDICARE

## 2025-06-30 DIAGNOSIS — Z94.0 KIDNEY REPLACED BY TRANSPLANT (HHS-HCC): ICD-10-CM

## 2025-06-30 RX ORDER — MYCOPHENOLATE MOFETIL 250 MG/1
750 CAPSULE ORAL 2 TIMES DAILY
Qty: 180 CAPSULE | Refills: 11 | Status: SHIPPED | OUTPATIENT
Start: 2025-06-30 | End: 2026-06-30

## 2025-06-30 NOTE — TELEPHONE ENCOUNTER
[facility] [place] - [place], [place] - [facility]  [address]  [facility] [address]  Phone: [phone] Fax: [phone]            mycophenolate (Cellcept) 250 mg capsule

## 2025-07-03 ENCOUNTER — TELEPHONE (OUTPATIENT)
Facility: HOSPITAL | Age: 65
End: 2025-07-03
Payer: MEDICARE

## 2025-07-03 DIAGNOSIS — Z94.0 KIDNEY REPLACED BY TRANSPLANT (HHS-HCC): ICD-10-CM

## 2025-07-03 RX ORDER — TAMSULOSIN HYDROCHLORIDE 0.4 MG/1
0.4 CAPSULE ORAL EVERY MORNING
Qty: 30 CAPSULE | Refills: 11 | Status: SHIPPED | OUTPATIENT
Start: 2025-07-03 | End: 2026-07-03

## 2025-07-03 NOTE — TELEPHONE ENCOUNTER
Refill submitted pending physician signature.  Patient in clinic 6/13    tamsulosin (Flomax) 0.4 mg 24 hr capsule     [facility] - [place], [place] - [address]  [address]  [facility] [address]  Phone: [phone] Fax: [phone]    Original refill submitted went to wrong pharmacy ([facility])

## 2025-07-03 NOTE — TELEPHONE ENCOUNTER
[facility] - [place], [place] - [place], [place] - [facility]  [address]  [facility] [address]  Phone: [phone] Fax: [phone]      tamsulosin (Flomax) 0.4 mg 24 hr capsule

## 2025-08-22 DIAGNOSIS — Z94.0 KIDNEY REPLACED BY TRANSPLANT (HHS-HCC): ICD-10-CM

## 2025-08-22 DIAGNOSIS — E21.3 HYPERPARATHYROIDISM (MULTI): ICD-10-CM
